# Patient Record
Sex: MALE
[De-identification: names, ages, dates, MRNs, and addresses within clinical notes are randomized per-mention and may not be internally consistent; named-entity substitution may affect disease eponyms.]

---

## 2019-10-21 ENCOUNTER — HOSPITAL ENCOUNTER (INPATIENT)
Dept: HOSPITAL 97 - ER | Age: 44
LOS: 4 days | Discharge: PSYCHIATRIC OR SUBSTANCE USE FACILITY | DRG: 896 | End: 2019-10-25
Attending: FAMILY MEDICINE | Admitting: HOSPITALIST
Payer: SELF-PAY

## 2019-10-21 VITALS — BODY MASS INDEX: 24 KG/M2

## 2019-10-21 DIAGNOSIS — F14.10: ICD-10-CM

## 2019-10-21 DIAGNOSIS — K21.9: ICD-10-CM

## 2019-10-21 DIAGNOSIS — F10.121: Primary | ICD-10-CM

## 2019-10-21 DIAGNOSIS — F15.10: ICD-10-CM

## 2019-10-21 DIAGNOSIS — Z72.89: ICD-10-CM

## 2019-10-21 DIAGNOSIS — R45.851: ICD-10-CM

## 2019-10-21 DIAGNOSIS — E87.2: ICD-10-CM

## 2019-10-21 DIAGNOSIS — F41.8: ICD-10-CM
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
## 2019-10-21 DIAGNOSIS — G93.41: ICD-10-CM

## 2019-10-21 DIAGNOSIS — E86.0: ICD-10-CM

## 2019-10-21 LAB
ALBUMIN SERPL BCP-MCNC: 4 G/DL (ref 3.4–5)
ALP SERPL-CCNC: 116 U/L (ref 45–117)
ALT SERPL W P-5'-P-CCNC: 34 U/L (ref 12–78)
AST SERPL W P-5'-P-CCNC: 29 U/L (ref 15–37)
BUN BLD-MCNC: 5 MG/DL (ref 7–18)
COHGB MFR BLDA: 2.5 % (ref 0–1.5)
GLUCOSE SERPLBLD-MCNC: 119 MG/DL (ref 74–106)
HCT VFR BLD CALC: 43.4 % (ref 39.6–49)
LYMPHOCYTES # SPEC AUTO: 1.6 K/UL (ref 0.7–4.9)
MAGNESIUM SERPL-MCNC: 2.5 MG/DL (ref 1.8–2.4)
NT-PROBNP SERPL-MCNC: 18 PG/ML (ref ?–125)
OXYHGB MFR BLDA: 95.5 % (ref 94–97)
PMV BLD: 8.1 FL (ref 7.6–11.3)
POTASSIUM SERPL-SCNC: 3.4 MMOL/L (ref 3.5–5.1)
RBC # BLD: 4.51 M/UL (ref 4.33–5.43)
SAO2 % BLDA: 98.7 % (ref 92–98.5)
TROPONIN (EMERG DEPT USE ONLY): < 0.02 NG/ML (ref 0–0.04)

## 2019-10-21 PROCEDURE — 82805 BLOOD GASES W/O2 SATURATION: CPT

## 2019-10-21 PROCEDURE — 99285 EMERGENCY DEPT VISIT HI MDM: CPT

## 2019-10-21 PROCEDURE — 72125 CT NECK SPINE W/O DYE: CPT

## 2019-10-21 PROCEDURE — 93005 ELECTROCARDIOGRAM TRACING: CPT

## 2019-10-21 PROCEDURE — 85025 COMPLETE CBC W/AUTO DIFF WBC: CPT

## 2019-10-21 PROCEDURE — 83735 ASSAY OF MAGNESIUM: CPT

## 2019-10-21 PROCEDURE — 84484 ASSAY OF TROPONIN QUANT: CPT

## 2019-10-21 PROCEDURE — 96367 TX/PROPH/DG ADDL SEQ IV INF: CPT

## 2019-10-21 PROCEDURE — 80329 ANALGESICS NON-OPIOID 1 OR 2: CPT

## 2019-10-21 PROCEDURE — 71045 X-RAY EXAM CHEST 1 VIEW: CPT

## 2019-10-21 PROCEDURE — 80048 BASIC METABOLIC PNL TOTAL CA: CPT

## 2019-10-21 PROCEDURE — 97116 GAIT TRAINING THERAPY: CPT

## 2019-10-21 PROCEDURE — 84145 PROCALCITONIN (PCT): CPT

## 2019-10-21 PROCEDURE — 85730 THROMBOPLASTIN TIME PARTIAL: CPT

## 2019-10-21 PROCEDURE — 70450 CT HEAD/BRAIN W/O DYE: CPT

## 2019-10-21 PROCEDURE — 97161 PT EVAL LOW COMPLEX 20 MIN: CPT

## 2019-10-21 PROCEDURE — 80164 ASSAY DIPROPYLACETIC ACD TOT: CPT

## 2019-10-21 PROCEDURE — 96365 THER/PROPH/DIAG IV INF INIT: CPT

## 2019-10-21 PROCEDURE — 36415 COLL VENOUS BLD VENIPUNCTURE: CPT

## 2019-10-21 PROCEDURE — 96361 HYDRATE IV INFUSION ADD-ON: CPT

## 2019-10-21 PROCEDURE — 80053 COMPREHEN METABOLIC PANEL: CPT

## 2019-10-21 PROCEDURE — 80076 HEPATIC FUNCTION PANEL: CPT

## 2019-10-21 PROCEDURE — 51702 INSERT TEMP BLADDER CATH: CPT

## 2019-10-21 PROCEDURE — 83605 ASSAY OF LACTIC ACID: CPT

## 2019-10-21 PROCEDURE — 81003 URINALYSIS AUTO W/O SCOPE: CPT

## 2019-10-21 PROCEDURE — 80307 DRUG TEST PRSMV CHEM ANLYZR: CPT

## 2019-10-21 PROCEDURE — 82947 ASSAY GLUCOSE BLOOD QUANT: CPT

## 2019-10-21 PROCEDURE — 96368 THER/DIAG CONCURRENT INF: CPT

## 2019-10-21 PROCEDURE — 97112 NEUROMUSCULAR REEDUCATION: CPT

## 2019-10-21 PROCEDURE — 85610 PROTHROMBIN TIME: CPT

## 2019-10-21 PROCEDURE — 80178 ASSAY OF LITHIUM: CPT

## 2019-10-21 PROCEDURE — 80320 DRUG SCREEN QUANTALCOHOLS: CPT

## 2019-10-21 PROCEDURE — 83880 ASSAY OF NATRIURETIC PEPTIDE: CPT

## 2019-10-21 NOTE — EDPHYS
Physician Documentation                                                                           

 [facility]                                                                 

Name: Milo Araujo Jr                                                                          

Age: 44 yrs                                                                                       

Sex: Male                                                                                         

: 1975                                                                                   

MRN: [id number]                                                                                   

Arrival Date: 10/21/2019                                                                          

Time: 22:48                                                                                       

Account#: [id number]                                                                            

Bed 4                                                                                             

Private MD:                                                                                       

ED Physician Ramesh Woo                                                                      

HPI:                                                                                              

10/21                                                                                             

22:57 This 44 yrs old  Male presents to ER via EMS with complaints of AMS, DRUG       mustapha 

      OVERDOSE.                                                                                   

22:57 The patient presents with a history of heart racing. Possible causes: drug use,         mustapha 

      alcohol, head injury, low blood sugar, sepsis. Associated signs and symptoms: Pertinent     

      positives: combativeness, confusion. Patient's baseline: Neuro: alert and fully             

      oriented. Unable to obtain HPI due to obtunded state, patient is being uncooperative.       

                                                                                                  

Historical:                                                                                       

- Allergies:                                                                                      

23:22 No Known Allergies;                                                                     wh  

- Home Meds:                                                                                      

23:22 Unable to obtain [Active];                                                              wh  

- PMHx:                                                                                           

23:22 Unable to obtain;                                                                         

- PSHx:                                                                                           

23:22 Unable to obtain;                                                                         

                                                                                                  

- Immunization history:: Adult Immunizations unknown.                                             

- Social history:: Smoking status: Patient uses tobacco products.                                 

- Family history:: not pertinent.                                                                 

- Ebola Screening: : No symptoms or risks identified at this time.                                

                                                                                                  

                                                                                                  

ROS:                                                                                              

22:57 Constitutional: Negative for fever, chills, and weight loss, Eyes: Negative for injury, mustapha 

      pain, redness, and discharge, ENT: Negative for injury, pain, and discharge, Neck:          

      Negative for injury, pain, and swelling, Respiratory: Negative for shortness of breath,     

      cough, wheezing, and pleuritic chest pain, Abdomen/GI: Negative for abdominal pain,         

      nausea, vomiting, diarrhea, and constipation, Back: Negative for injury and pain, :       

      Negative for injury, bleeding, discharge, and swelling, MS/Extremity: Negative for          

      injury and deformity, Skin: Negative for injury, rash, and discoloration, Psych:            

      Negative for depression, anxiety, suicide ideation, homicidal ideation, and                 

      hallucinations, Allergy/Immunology: Negative for hives, rash, and allergies, Endocrine:     

      Negative for neck swelling, polydipsia, polyuria, polyphagia, and marked weight             

      changes, Hematologic/Lymphatic: Negative for swollen nodes, abnormal bleeding, and          

      unusual bruising.                                                                           

22:57 Cardiovascular: Positive for palpitations.                                                  

22:57 Neuro: Positive for altered mental status.                                                  

                                                                                                  

Exam:                                                                                             

22:57 Constitutional:  This is a well developed, well nourished patient who is awake, alert,  mustapha 

      and in no acute distress. Head/Face:  Normocephalic, atraumatic. Eyes:  Pupils equal        

      round and reactive to light, extra-ocular motions intact.  Lids and lashes normal.          

      Conjunctiva and sclera are non-icteric and not injected.  Cornea within normal limits.      

      Periorbital areas with no swelling, redness, or edema. ENT:  Nares patent. No nasal         

      discharge, no septal abnormalities noted.  Tympanic membranes are normal and external       

      auditory canals are clear.  Oropharynx with no redness, swelling, or masses, exudates,      

      or evidence of obstruction, uvula midline.  Mucous membranes moist. Neck:  Trachea          

      midline, no thyromegaly or masses palpated, and no cervical lymphadenopathy.  Supple,       

      full range of motion without nuchal rigidity, or vertebral point tenderness.  No            

      Meningismus. Chest/axilla:  Normal chest wall appearance and motion.  Nontender with no     

      deformity.  No lesions are appreciated. Respiratory:  Lungs have equal breath sounds        

      bilaterally, clear to auscultation and percussion.  No rales, rhonchi or wheezes noted.     

       No increased work of breathing, no retractions or nasal flaring. Abdomen/GI:  Soft,        

      non-tender, with normal bowel sounds.  No distension or tympany.  No guarding or            

      rebound.  No evidence of tenderness throughout. Back:  No spinal tenderness.  No            

      costovertebral tenderness.  Full range of motion. Male :  Normal genitalia with no        

      discharge or lesions. Skin:  Warm, dry with normal turgor.  Normal color with no            

      rashes, no lesions, and no evidence of cellulitis. MS/ Extremity:  Pulses equal, no         

      cyanosis.  Neurovascular intact.  Full, normal range of motion. Psych:  Awake, alert,       

      with orientation to person, place and time.  Behavior, mood, and affect are within          

      normal limits.                                                                              

22:57 Cardiovascular: Rate: tachycardic, Rhythm: regular, Pulses: Pulses are 2+ in bilateral      

      radial, brachial, femoral, popliteal, posterior tibial and and dorsalis pedis               

      arteries.. Heart sounds: normal, normal S1and S2, no S3 or S4, no murmur, no rub, no        

      gallop, Edema: is not appreciated, JVD: is not appreciated.                                 

                                                                                                  

Vital Signs:                                                                                      

22:53 BP 84 / 68; Pulse 140; Resp 20; Temp 96.9; Pulse Ox 99% 2 lpm ;                           

10/22                                                                                             

00:04 Weight 81.65 kg;                                                                        ea  

00:06  / 72; Pulse 124; Resp 18; Pulse Ox 100% ;                                        ea  

00:21  / 72; Pulse 119; Resp 18; Pulse Ox 100% ;                                        ea  

00:25 Temp 97.4;                                                                              ea  

01:00  / 64; Pulse 105; Resp 18; Pulse Ox 98% on R/A;                                   ea  

02:00  / 65; Pulse 98; Resp 18; Temp 97.6; Pulse Ox 98% on R/A;                         ea  

03:00  / 68; Pulse 98; Resp 18; Pulse Ox 97% on R/A;                                      

                                                                                                  

MDM:                                                                                              

10/21                                                                                             

22:49 Patient medically screened.                                                             [facility] 

23:00 Data reviewed: vital signs, nurses notes, lab test result(s), EKG, radiologic studies,  [facility] 

      CT scan, plain films.                                                                       

                                                                                                  

10/21                                                                                             

22:50 Order name: Acetaminophen; Complete Time: 00:13                                           

10/21                                                                                             

22:50 Order name: Basic Metabolic Panel; Complete Time: 00:13                                   

10/21                                                                                             

22:50 Order name: CBC with Diff; Complete Time: 23:54                                           

10/21                                                                                             

22:50 Order name: ETOH Level; Complete Time: 23:54                                              

10/21                                                                                             

22:50 Order name: Hepatic Function; Complete Time: 00:13                                        

10/21                                                                                             

22:50 Order name: PT-INR                                                                        

10/21                                                                                             

22:50 Order name: Ptt, Activated                                                                

10/21                                                                                             

22:50 Order name: Salicylate; Complete Time: 23:54                                              

10/21                                                                                             

22:50 Order name: Urine Drug Screen                                                             

10/21                                                                                             

22:56 Order name: Procalcitonin                                                               [facility] 

10/21                                                                                             

22:56 Order name: Lactate; Complete Time: 23:54                                               [facility] 

10/21                                                                                             

22:56 Order name: XRAY Chest (1 view)                                                         [facility] 

10/21                                                                                             

22:56 Order name: CT Head C Spine                                                             [facility] 

10/21                                                                                             

23:04 Order name: ABG; Complete Time: 23:54                                                   aa1 

10/21                                                                                             

23:04 Order name: Urine Dipstick--Ancillary (enter results); Complete Time: 23:54             cm6 

10/21                                                                                             

23:40 Order name: Troponin (Emerg Dept Use Only); Complete Time: 00:13                        [facility]

10/21                                                                                             

23:40 Order name: NT PRO-BNP; Complete Time: 00:13                                            [facility]

10/21                                                                                             

23:40 Order name: Magnesium; Complete Time: 00:13                                             [facility]

10/22                                                                                             

00:14 Order name: Lactate                                                                     cm6 

10/22                                                                                             

01:15 Order name: CBC with Automated Diff                                                     [facility]

10/22                                                                                             

01:15 Order name: CBC with Automated Diff                                                     [facility]

10/22                                                                                             

01:15 Order name: Comprehensive Metabolic Panel                                               [facility]

10/22                                                                                             

01:15 Order name: Comprehensive Metabolic Panel                                               [facility]

10/21                                                                                             

22:50 Order name: EKG; Complete Time: 22:51                                                     

10/21                                                                                             

22:50 Order name: EKG - Nurse/Tech; Complete Time: 23:00                                        

10/21                                                                                             

22:50 Order name: IV Saline Lock; Complete Time: 23:00                                          

10/21                                                                                             

22:50 Order name: Labs collected and sent; Complete Time: 23:01                                 

10/21                                                                                             

22:50 Order name: Urine Dipstick-Ancillary (obtain specimen); Complete Time: 23:01              

10/21                                                                                             

22:56 Order name: Cardiac monitoring; Complete Time: 23:01                                    [facility] 

10/21                                                                                             

22:56 Order name: O2 Per Protocol; Complete Time: 23:01                                       [facility] 

10/21                                                                                             

22:56 Order name: O2 Sat Monitoring; Complete Time: 23:01                                     [facility] 

10/21                                                                                             

22:56 Order name: Garcia; Complete Time: 23:01                                                 [facility] 

10/21                                                                                             

22:56 Order name: Restraint:Violent/Self Destructive (Adult:17yo or >); Complete Time: 23:01  [facility] 

10/22                                                                                             

01:15 Order name: CONS Pharmacy Consult                                                       [facility]

10/22                                                                                             

01:15 Order name: Regular                                                                     [facility]

                                                                                                  

Administered Medications:                                                                         

23:00 Drug: NS 0.9% (30 ml/kg) 30 ml/kg Route: IV; Rate: bolus; Site: right forearm;            

10/22                                                                                             

01:56 Follow up: Response: No adverse reaction; IV Status: Completed infusion; IV Intake:     ea  

      2500ml                                                                                      

10/21                                                                                             

23:10 Drug: foLIC Acid 1 mg Route: IVPB; Site: right forearm;                                 ea  

10/22                                                                                             

00:04 Follow up: IV Status: Completed infusion                                                  

10/21                                                                                             

23:11 Drug: Thiamine 100 mg Route: IV; Rate: bolus; Site: right forearm;                      ea  

10/22                                                                                             

00:04 Follow up: Response: No adverse reaction; IV Status: Completed infusion                   

10/21                                                                                             

23:15 Drug: Zosyn 3.375 grams Route: IVPB; Infused Over: 60 mins; Site: left antecubital;       

10/22                                                                                             

00:20 Follow up: Response: No adverse reaction; IV Status: Completed infusion; IV Intake:     ea  

      100ml                                                                                       

00:04 Drug: NS 0.9% 1000 ml Route: IV; Rate: 125 ml/hr; Site: left antecubital;                 

01:54 Drug: Potassium Chloride 20 mEq Route: IV; Rate: per protocol; Site: left antecubital;    

03:22 Follow up: Response: No adverse reaction; IV Status: Completed infusion                   

                                                                                                  

                                                                                                  

Disposition:                                                                                      

10/21/19 23:03 Hospitalization ordered by Merrick Deras for Inpatient Admission. Preliminary     

  diagnosis are Altered mental status, unspecified, Tachycardia, unspecified,                     

  Abuse of non-psychoactive substances, Hypokalemia.                                              

- Bed requested for Telemetry/MedSurg (Inpatient).                                                

- Status is Inpatient Admission.                                                                

- Condition is Stable.                                                                            

- Problem is new.                                                                                 

- Symptoms are unchanged.                                                                         

UTI on Admission? No                                                                              

                                                                                                  

                                                                                                  

                                                                                                  

Signatures:                                                                                       

Dispatcher [employer]                           EDMS                                                 

Ramesh Woo MD MD cha Garcia, Cindy, RN                       PROSPER                                                      

Latasha Truong RN RN ea Habalo, Winsy                                                                                   

                                                                                                  

Corrections: (The following items were deleted from the chart)                                    

10/21                                                                                             

23:39 22:57 MAGNESIUM+C.LAB.BRZ ordered. [facility]

23:39 22:57 PROBNP+C.LAB.BRZ ordered. [facility]                                                    EDMS

23:39 22:57 TROPONIN (EMERG DEPT USE ONLY)+C.LAB.BRZ ordered. [facility]

10/22                                                                                             

00:14 10/21 23:03 Hospitalization Ordered by Merrick Deras MD for Inpatient Admission.        [facility] 

      Preliminary diagnosis is Altered mental status, unspecified; Tachycardia, unspecified;      

      Abuse of non-psychoactive substances. Bed requested for Telemetry/MedSurg (Inpatient).      

      Status is Inpatient Admission. Condition is Stable. Problem is new. Symptoms are            

      unchanged. UTI on Admission? No. mustapha                                                        

10/22                                                                                             

01:53 00:14 10/21/2019 23:03 Hospitalization Ordered by Merrick Deras MD for Inpatient        [facility]  

      Admission. Preliminary diagnosis is Altered mental status, unspecified; Tachycardia,        

      unspecified; Abuse of non-psychoactive substances; Hypokalemia. Bed requested for           

      Telemetry/MedSurg (Inpatient). Status is Inpatient Admission. Condition is Stable.          

      Problem is new. Symptoms are unchanged. UTI on Admission? No. mustapha                           

03:19 01:53 10/21/2019 23:03 Hospitalization Ordered by Merrick Deras MD for Inpatient          

      Admission. Preliminary diagnosis is Altered mental status, unspecified; Tachycardia,        

      unspecified; Abuse of non-psychoactive substances; Hypokalemia. Bed requested for           

      Telemetry/MedSurg (Inpatient). Status is Inpatient Admission. Condition is Stable.          

      Problem is new. Symptoms are unchanged. UTI on Admission? No.                             

                                                                                                  

**************************************************************************************************

## 2019-10-21 NOTE — ER
Nurse's Notes                                                                                     

 [facility]                                                                 

Name: Milo Araujo Jr                                                                          

Age: 44 yrs                                                                                       

Sex: Male                                                                                         

: 1975                                                                                   

MRN: [id number]                                                                                   

Arrival Date: 10/21/2019                                                                          

Time: 22:48                                                                                       

Account#: [id number]                                                                            

Bed 4                                                                                             

Private MD:                                                                                       

Diagnosis: Altered mental status, unspecified;Tachycardia, unspecified;Abuse of non-psychoactive  

  substances;Hypokalemia                                                                          

                                                                                                  

Presentation:                                                                                     

10/21                                                                                             

22:49 Presenting complaint: EMS states: [place], someone was      

      able to track his friend and his friend called EMS. Transition of care: patient was not     

      received from another setting of care. Onset of symptoms was 2019. Risk         

      Assessment: Do you want to hurt yourself or someone else? Unable to obtain. Initial         

      Sepsis Screen: Does the patient meet any 2 criteria? HR > 90 bpm. Does the patient have     

      a suspected source of infection? No. Patient's initial sepsis screen is negative. Care      

      prior to arrival: Medication(s) given: Narcan 2 mg IV IV initiated. 18 GA, in the left      

      antecubital area, Glucose check: 108.                                                       

22:49 Method Of Arrival: EMS: [facility]                                                         

22:49 Acuity: JEFFREY 3                                                                             

                                                                                                  

Triage Assessment:                                                                                

23:25 General: Appears Unresponsive.                                                            

                                                                                                  

Historical:                                                                                       

- Allergies:                                                                                      

23:22 No Known Allergies;                                                                       

- Home Meds:                                                                                      

23:22 Unable to obtain [Active];                                                                

- PMHx:                                                                                           

23:22 Unable to obtain;                                                                         

- PSHx:                                                                                           

23:22 Unable to obtain;                                                                         

                                                                                                  

- Immunization history:: Adult Immunizations unknown.                                             

- Social history:: Smoking status: Patient uses tobacco products.                                 

- Family history:: not pertinent.                                                                 

- Ebola Screening: : No symptoms or risks identified at this time.                                

                                                                                                  

                                                                                                  

Screenin:22 Abuse screen: Denies threats or abuse. Denies injuries from another. Nutritional          

      screening: No deficits noted. Tuberculosis screening: No symptoms or risk factors           

      identified. Fall Risk None identified.                                                      

                                                                                                  

Assessment:                                                                                       

23:23 General: Behavior is unresponsive. Pt responds to pain. Pain: Unable to use pain scale. wh  

      Patient is unresponsive. Neuro: Level of Consciousness is unresponsive, Pupils are          

      pinpoint. Cardiovascular: Heart tones S1 S2. Respiratory: Airway is patent Respiratory      

      effort is even, unlabored, Respiratory pattern is regular, symmetrical, Breath sounds       

      are clear bilaterally. GI: Abdomen is flat, non-distended. : Genitalia appear normal.     

      EENT: Oral mucosa is moist. Derm: Skin is intact, is healthy with good turgor, Skin is      

      pink, warm \T\ dry. normal. Musculoskeletal: Circulation, motion, and sensation intact.     

10/22                                                                                             

00:00 Reassessment: Patient and/or family updated on plan of care and expected duration. Pain ea  

      level reassessed. Pt resting no longer attempting to remove IV lines. Order obtained to     

      discontinue restraints. Restraints discontinued, pt tolerated well. Pt resting with         

      eyes closed, respirations even and symmetrical. No s/s of pain or discomfort.               

01:00 Reassessment: Patient and/or family updated on plan of care and expected duration. Pain ea  

      level reassessed. Pt resting with eyes closed, respirations even and unlabored, chest       

      expansions even and symmetrical.                                                            

02:45 Reassessment: Patient and/or family updated on plan of care and expected duration. Pain ea  

      level reassessed. Pt resting with eyes closed, respirations even and unlabored. Chest       

      expansions even and symmetrical. No s/s of pain or discomfort noted at this time.           

03:20 Reassessment: Patient and/or family updated on plan of care and expected duration. Pain ea  

      level reassessed. Pt resting with eyes closed, respirations even and unlabored, chest       

      expansions even and symmetrical. No s/s of pain or discomfort noted at this time. Pt        

      admitted to fourth floor, left ED via stretcher, tolerating well.                           

                                                                                                  

Vital Signs:                                                                                      

10/21                                                                                             

22:53 BP 84 / 68; Pulse 140; Resp 20; Temp 96.9; Pulse Ox 99% 2 lpm ;                           

10/22                                                                                             

00:04 Weight 81.65 kg;                                                                        ea  

00:06  / 72; Pulse 124; Resp 18; Pulse Ox 100% ;                                        ea  

00:21  / 72; Pulse 119; Resp 18; Pulse Ox 100% ;                                        ea  

00:25 Temp 97.4;                                                                              ea  

01:00  / 64; Pulse 105; Resp 18; Pulse Ox 98% on R/A;                                   ea  

02:00  / 65; Pulse 98; Resp 18; Temp 97.6; Pulse Ox 98% on R/A;                         ea  

03:00  / 68; Pulse 98; Resp 18; Pulse Ox 97% on R/A;                                      

                                                                                                  

ED Course:                                                                                        

10/21                                                                                             

22:48 Patient arrived in ED.                                                                    

22:49 Ramesh Woo MD is Attending Physician.                                             [facility] 

22:49 Faustino Santana is Primary Nurse.                                                           

22:49 EKG done, by ED staff, reviewed by Ramesh Woo MD. Straight cath inserted, using     mt  

      sterile technique, 16 Fr. Specimen obtained. Returned clear yellow urine. Patient           

      tolerated well.                                                                             

22:50 Inserted saline lock: 18 gauge in right forearm, using aseptic technique.               ea  

22:52 Triage completed.                                                                         

23:02 Merrick Deras MD is Hospitalizing Provider.                                           [facility] 

23:02 Garcia cath inserted, using sterile technique, 16 Fr., by me, balloon inflated, to       mt  

      gravity drainage, urine specimen collected. returned clear yellow urine. Patient            

      tolerated well.                                                                             

23:05 Patient has correct armband on for positive identification. Placed in gown. Bed in low  wh  

      position. Call light in reach. Side rails up X 1. Cardiac monitor on. Pulse ox on. NIBP     

      on.                                                                                         

23:11 Radiology exam delayed due to Patient is currently being attended by                    kw1 

      nursing/respiratory staff.                                                                  

23:22 Arm band placed on right wrist.                                                           

23:25 XRAY Chest (1 view) In Process Unspecified.                                             [facility]

23:49 CT Head C Spine In Process Unspecified.                                                 [facility]

10/22                                                                                             

03:14 No provider procedures requiring assistance completed. Patient admitted, IV remains in  wh  

      place.                                                                                      

                                                                                                  

Restraints:                                                                                       

10/21                                                                                             

22:56 Violent/Self Destructive Restraint: Order: obtained. Initiated 2019 at      ea  

      22:56 Staff present during the Initiation of Restraint: Faustino Zafar RN .            

      Observed actions/behavior: unable to follow instructions, rptd attempts to                  

      remove/tamper lines/tubes/IV/med devices \T\ wnd dressing, Monitoring: Mental status:       

      confused. Cognition: Unable to assess. Circulation: Within defined parameters (based on     

      Cardiovascular assessment). Skin integrity: Within defined parameters (based on             

      Integumentary assessment) Restraint status: Soft wrist restraint (Right) Started. Soft      

      wrist restraint (Left) Started.                                                             

10/22                                                                                             

00:00 Violent/Self Destructive Restraint: Readiness for Discontinue: Release criteria met. No ea  

      longer exhibiting violent or self destructive behavior. Alt interventions effective.        

      Restraint discontinuation: Discontinued at 2019 at 00:00.                       

                                                                                                  

Administered Medications:                                                                         

10/21                                                                                             

23:00 Drug: NS 0.9% (30 ml/kg) 30 ml/kg Route: IV; Rate: bolus; Site: right forearm;          ea  

10/22                                                                                             

01:56 Follow up: Response: No adverse reaction; IV Status: Completed infusion; IV Intake:     ea  

      2500ml                                                                                      

10/21                                                                                             

23:10 Drug: foLIC Acid 1 mg Route: IVPB; Site: right forearm;                                 ea  

10/22                                                                                             

00:04 Follow up: IV Status: Completed infusion                                                  

10/21                                                                                             

23:11 Drug: Thiamine 100 mg Route: IV; Rate: bolus; Site: right forearm;                      ea  

10/22                                                                                             

00:04 Follow up: Response: No adverse reaction; IV Status: Completed infusion                   

10/21                                                                                             

23:15 Drug: Zosyn 3.375 grams Route: IVPB; Infused Over: 60 mins; Site: left antecubital;     ea  

10/22                                                                                             

00:20 Follow up: Response: No adverse reaction; IV Status: Completed infusion; IV Intake:     ea  

      100ml                                                                                       

00:04 Drug: NS 0.9% 1000 ml Route: IV; Rate: 125 ml/hr; Site: left antecubital;                 

01:54 Drug: Potassium Chloride 20 mEq Route: IV; Rate: per protocol; Site: left antecubital;  ea  

03:22 Follow up: Response: No adverse reaction; IV Status: Completed infusion                   

                                                                                                  

                                                                                                  

Intake:                                                                                           

00:20 IV: 100ml; Total: 100ml.                                                                ea  

01:56 IV: 2500ml; Total: 2600ml.                                                                

                                                                                                  

Outcome:                                                                                          

10/21                                                                                             

23:03 Decision to Hospitalize by Provider.                                                    [facility] 

10/22                                                                                             

03:15 Admitted to Tele accompanied by tech, via stretcher, room 401, with chart, Report       wh  

      called to  Moody Beauchamp RN                                                                 

      Condition: stable                                                                           

      Instructed on the need for admit.                                                           

03:19 Patient left the ED.                                                                      

                                                                                                  

Signatures:                                                                                       

Dispatcher [employer]                           EDRamesh Berg MD MD cha Thompson, Moriah mt Antunez, Elena RN                      Faustino Duffy ea, Kimberly                            kw1                                                  

                                                                                                  

Corrections: (The following items were deleted from the chart)                                    

02:26 02:00  / 65; Pulse 98bpm; Resp 18bpm; Pulse Ox 98% RA; ea                           

03:18 03:15 Admitted to Tele accompanied by tech, via stretcher, room 407, with chart, Report jonathan  

      called to Moody castillo                                                               

                                                                                                  

**************************************************************************************************

## 2019-10-22 LAB
ALBUMIN SERPL BCP-MCNC: 3.2 G/DL (ref 3.4–5)
ALP SERPL-CCNC: 93 U/L (ref 45–117)
ALT SERPL W P-5'-P-CCNC: 26 U/L (ref 12–78)
AST SERPL W P-5'-P-CCNC: 22 U/L (ref 15–37)
BLD SMEAR INTERP: (no result)
BUN BLD-MCNC: 4 MG/DL (ref 7–18)
GLUCOSE SERPLBLD-MCNC: 75 MG/DL (ref 74–106)
HCT VFR BLD CALC: 43 % (ref 39.6–49)
INR BLD: 1
LYMPHOCYTES # SPEC AUTO: 0.7 K/UL (ref 0.7–4.9)
METHAMPHET UR QL SCN: POSITIVE
MORPHOLOGY BLD-IMP: (no result)
PMV BLD: 8.3 FL (ref 7.6–11.3)
POTASSIUM SERPL-SCNC: 3.6 MMOL/L (ref 3.5–5.1)
RBC # BLD: 4.51 M/UL (ref 4.33–5.43)
THC SERPL-MCNC: NEGATIVE NG/ML

## 2019-10-22 RX ADMIN — SODIUM CHLORIDE SCH MLS: 9 INJECTION, SOLUTION INTRAVENOUS at 10:52

## 2019-10-22 RX ADMIN — Medication SCH: at 09:00

## 2019-10-22 RX ADMIN — Medication SCH ML: at 20:52

## 2019-10-22 NOTE — RAD REPORT
EXAM DESCRIPTION:  RAD - Chest Single View - 10/21/2019 11:27 pm

 

CLINICAL HISTORY:  Cough, altered mental status

 

COMPARISON:  None.

 

TECHNIQUE:  AP portable chest image was obtained 2321 hours .

 

FINDINGS:  Lungs are clear. Heart and vasculature are normal. No measurable pleural effusion and no p
neumothorax. No acute bony abnormality seen. No acute aortic findings suspected.

 

IMPRESSION:  No acute cardiopulmonary process.

## 2019-10-22 NOTE — P.HP
Certification for Inpatient


Patient admitted to: Inpatient


With expected LOS: >2 Midnights


Patient will require the following post-hospital care: None


Practitioner: I am a practitioner with admitting privileges, knowledge of 

patient current condition, hospital course, and medical plan of care.


Services: Services provided to patient in accordance with Admission 

requirements found in Title 42 Section 412.3 of the Code of Federal Regulations





Patient History


Date of Service: 10/22/19


Reason for admission: Altered mental status


History of Present Illness: 





Patient is a 44-year-old gentleman who came into the hospital lethargic and 

difficult to arouse.  Patient was found to have cocaine and amphetamine 

intoxication as well as alcohol toxicity.  Patient was brought into the 

emergency room and a CT of the brain was ordered.  This was negative.  Patient 

had multiple lab studies done which were unremarkable except for the alcohol 

level in the positive drug screen.  Patient also had lactic acidosis.  At this 

time, patient be admitted to the hospital for further evaluation.


Allergies





Unable to Assess Allergy (Unverified 10/22/19 02:40)


 








- Past Medical/Surgical History


-: UNABLE TO OBTAIN HISTORY, PT IS AMS


Past Surgical History: Unable to obtain





- Family History


  ** Father


Family History: Reviewed- Non-Contributory





- Social History


Smoking Status: Unknown if ever smoked





Review of Systems


10-point ROS is otherwise unremarkable





Physical Examination





- Vital Signs


Temperature: 97.1 F


Blood Pressure: 120/65


Pulse: 129


Respirations: 18


Pulse Ox (%): 98





- Physical Exam


General: Alert, In no apparent distress, Oriented x3


HEENT: Atraumatic, PERRLA, Mucous membr. moist/pink, EOMI, Sclerae nonicteric


Neck: Supple, 2+ carotid pulse no bruit, No LAD, Without JVD or thyroid 

abnormality


Respiratory: Clear to auscultation bilaterally, Normal air movement


Cardiovascular: Regular rate/rhythm, Normal S1 S2, No murmurs


Gastrointestinal: Normal bowel sounds, Soft and benign, Non-distended, No 

tenderness


Musculoskeletal: No clubbing, No swelling, No tenderness


Integumentary: No rashes


Neurological: Normal gait, Normal speech, Normal strength at 5/5 x4 extr, 

Normal tone, Sensation intact, Cranial nerves 3-12 intact, Normal affect


Lymphatics: No axilla or inguinal lymphadenopathy





- Studies


Laboratory Data (last 24 hrs)





10/22/19 00:47: PT 11.8, INR 1.00, APTT 22.1 L


10/21/19 23:05: Magnesium Cancelled


10/21/19 23:05: WBC 3.1 L, Hgb 14.6, Hct 43.4, Plt Count 185


10/21/19 22:50: Sodium 139, Potassium 3.4 L, BUN 5 L, Creatinine 1.07, Glucose 

119 H, Magnesium 2.5 H, Total Bilirubin 0.6, AST 29, ALT 34, Alkaline 

Phosphatase 116








Assessment & Plan





- Problems (Diagnosis)


(1) Altered mental status


Current Visit: Yes   Status: Acute   





(2) Amphetamine abuse


Current Visit: Yes   Status: Acute   





(3) Cocaine abuse


Current Visit: Yes   Status: Acute   





(4) Alcoholic intoxication


Current Visit: Yes   Status: Acute   





(5) Lactic acid acidosis


Current Visit: Yes   Status: Acute   





- Plan





Plan:


1. IV hydration; if patient wakes up and neuro status is back to baseline then 

anticipate discharge home


2. Monitor neuro status; neurochecks q2 hr


3. CT of the brain is negative; if patient does not wake up more alert then we 

may need neurology consultation


4. GI and DVT prophylaxis


Discharge Plan: Home


Plan to discharge in: 48 Hours





- Advance Directives


Does patient have a Living Will: No


Does patient have a Durable POA for Healthcare: No





- Code Status/Comfort Care


Code Status Assessed: Yes


Code Status: Full Code


Critical Care: No


Time Spent Managing PTS Care (In Minutes): 45

## 2019-10-22 NOTE — RAD REPORT
EXAM DESCRIPTION:  CT - [id number] - 10/22/2019 2:52 am

 

CLINICAL HISTORY:  Trauma.

 

COMPARISON:  None.

 

TECHNIQUE:  CT scan of the brain and cervical spine without IV contrast. This exam was performed acco
rding to our departmental dose-optimization program, which includes automated exposure control, adjus
tment of the mA and/or kV according to patient size and/or use of iterative reconstruction technique.


 

FINDINGS:  BRAIN:

The ventricles, cisterns, and sulci are age-appropriate. No evidence of acute infarction, intracrania
l hemorrhage, extra-axial fluid collection, or midline shift. No air-fluid levels are seen in the par
anasal sinuses to suggest acute sinusitis. No depressed skull fracture.

CERVICAL SPINE:

No acute cervical fracture or prevertebral soft tissue swelling. There is straightening of the normal
 cervical lordosis, which may be due to cervical collar, muscle spasm, or patient positioning. There 
is mild multilevel degenerative disc disease. The facet joints are intact. No high-grade spinal canal
 stenosis is identified. The visualized lungs are clear.

 

IMPRESSION:  1.   No acute intracranial hemorrhage.

2.   No acute fracture or subluxation of the cervical spine.

 

Electronically signed by:   Felipe Ross MD   10/21/2019 11:57 PM CDT Workstation: [id number]

 

 

Due to temporary technical issues with the PACS/Fluency reporting system, reports are being signed by
 the in house radiologist as a courtesy to ensure prompt reporting. The interpreting radiologist is f
ully responsible for the content of the report.

## 2019-10-22 NOTE — EKG
Test Date:    2019-10-21               Test Time:    22:42:18

Technician:   MT                                     

                                                     

MEASUREMENT RESULTS:                                       

Intervals:                                           

Rate:         151                                    

LA:                                                  

QRSD:         76                                     

QT:           280                                    

QTc:          443                                    

Axis:                                                

P:                                                   

LA:                                                  

QRS:          6                                      

T:            45                                     

                                                     

INTERPRETIVE STATEMENTS:                                       

                                                     

Supraventricular tachycardia

Nonspecific ST abnormality

Abnormal ECG

No previous ECG available for comparison



Electronically Signed On 10-22-19 12:09:08 CDT by Ian Dominguez

## 2019-10-23 LAB
ALBUMIN SERPL BCP-MCNC: 3.1 G/DL (ref 3.4–5)
ALP SERPL-CCNC: 100 U/L (ref 45–117)
ALT SERPL W P-5'-P-CCNC: 23 U/L (ref 12–78)
AST SERPL W P-5'-P-CCNC: 23 U/L (ref 15–37)
BUN BLD-MCNC: 6 MG/DL (ref 7–18)
GLUCOSE SERPLBLD-MCNC: 79 MG/DL (ref 74–106)
HCT VFR BLD CALC: 42.1 % (ref 39.6–49)
LYMPHOCYTES # SPEC AUTO: 0.9 K/UL (ref 0.7–4.9)
MORPHOLOGY BLD-IMP: (no result)
PMV BLD: 8.3 FL (ref 7.6–11.3)
POTASSIUM SERPL-SCNC: 3.3 MMOL/L (ref 3.5–5.1)
RBC # BLD: 4.46 M/UL (ref 4.33–5.43)

## 2019-10-23 RX ADMIN — SODIUM CHLORIDE SCH MLS: 9 INJECTION, SOLUTION INTRAVENOUS at 10:20

## 2019-10-23 RX ADMIN — SODIUM CHLORIDE SCH MLS: 0.9 INJECTION, SOLUTION INTRAVENOUS at 17:50

## 2019-10-23 RX ADMIN — Medication SCH ML: at 10:20

## 2019-10-23 RX ADMIN — Medication SCH ML: at 20:34

## 2019-10-23 NOTE — P.PN
Subjective


Date of Service: 10/23/19


Chief Complaint: Altered mental status





Pt seen and examined at bedside. Chart reviewed. Feeling better than before. Pt 

appears to be less agitated at this time. Will monitor closely in ICU     





Review of Systems


10-point ROS is otherwise unremarkable





Physical Examination





- Vital Signs


Temperature: 98.8 F


Blood Pressure: 108/83


Pulse: 101


Respirations: 20


Pulse Ox (%): 98





- Physical Exam


General: Alert, In no apparent distress


HEENT: Atraumatic, PERRLA, EOMI


Neck: Supple, JVD not distended


Respiratory: Clear to auscultation bilaterally, Normal air movement


Cardiovascular: Regular rate/rhythm, Normal S1 S2


Gastrointestinal: Normal bowel sounds, No tenderness


Musculoskeletal: No tenderness


Integumentary: No rashes


Neurological: Normal speech, Normal tone, Normal affect


Lymphatics: No axilla or inguinal lymphadenopathy





- Studies


Medications List Reviewed: Yes





Assessment And Plan





- Current Problems (Diagnosis)


(1) Altered mental status


Current Visit: Yes   Status: Chronic   


Plan: 


Metabolic Encephalopathy 


-Most likely 2.2 to Alcohol vs Drug abuse 


-Will monitor closely 





Qualifiers: 


   Altered mental status type: delirium   Qualified Code(s): R41.0 - 

Disorientation, unspecified   





(2) Alcoholic intoxication


Current Visit: Yes   Status: Acute   


Plan: 


Pt initial Alcohol level > 110


-IV fluids with multivitamins 


-CIWA protocol


\


Qualifiers: 


   Complication of substance-induced condition: with delirium   Qualified Code(s

): F10.921 - Alcohol use, unspecified with intoxication delirium   





(3) Amphetamine abuse


Current Visit: Yes   Status: Chronic   





(4) Cocaine abuse


Current Visit: Yes   Status: Chronic   





- Plan





Monitor in the ICU for now 





Discharge Plan: Home


Plan to discharge in: Greater than 2 days





- Code Status/Comfort Care


Code Status Assessed: Yes


Critical Care: No

## 2019-10-24 RX ADMIN — FAMOTIDINE SCH MG: 20 TABLET, FILM COATED ORAL at 21:10

## 2019-10-24 RX ADMIN — Medication SCH ML: at 09:00

## 2019-10-24 RX ADMIN — Medication SCH ML: at 21:00

## 2019-10-24 RX ADMIN — SODIUM CHLORIDE SCH MLS: 0.9 INJECTION, SOLUTION INTRAVENOUS at 01:12

## 2019-10-24 RX ADMIN — SODIUM CHLORIDE SCH MLS: 9 INJECTION, SOLUTION INTRAVENOUS at 08:53

## 2019-10-24 NOTE — P.PN
Subjective


Date of Service: 10/24/19


Primary Care Provider: none


Chief Complaint: Altered mental status


Subjective: Other (Patient appears improved.  Less agitated this morning.  

Nurses provided medication last night for agitation.)





Physical Examination





- Vital Signs


Temperature: 98.2 F


Blood Pressure: 121/87


Pulse: 68


Respirations: 12


Pulse Ox (%): 99





- Physical Exam


General: Alert, In no apparent distress, Cooperative


HEENT: Atraumatic


Neck: Supple


Respiratory: Clear to auscultation bilaterally, Normal air movement


Cardiovascular: Normal pulses, Regular rate/rhythm


Gastrointestinal: Normal bowel sounds, Soft and benign, Non-distended, No masses

, No rebound, No guarding


Neurological: Normal speech, Normal strength at 5/5 x4 extr, Normal tone, 

Normal affect





- Studies


Medications List Reviewed: Yes





Assessment & Plan


Discharge Plan: Psychiatry


Plan to discharge in: 24 Hours


Physician Review Additional Text: 





Impression:


Metabolic encephalopathy related to alcohol abuse with intoxication complicated 

with amphetamine and cocaine abuse


Suicidal ideation


Depression with anxiety





Plan:


Patient less agitated today.  Will transition off IV fluids.  Will provide 

Librium.  Will provide IV benzodiazepine as needed for agitation.  Will monitor 

the patient closely.  Will consult [facility] to evaluate patient for inpatient 

psychiatric evaluation and treatment.  Patient likely requires transfer to 

inpatient facility to continue his care.





Time Spent Managing Pts Care (In Minutes): 55

## 2019-10-25 VITALS — DIASTOLIC BLOOD PRESSURE: 91 MMHG | SYSTOLIC BLOOD PRESSURE: 102 MMHG

## 2019-10-25 VITALS — OXYGEN SATURATION: 95 %

## 2019-10-25 VITALS — TEMPERATURE: 97.9 F

## 2019-10-25 RX ADMIN — FAMOTIDINE SCH MG: 20 TABLET, FILM COATED ORAL at 08:20

## 2019-10-25 RX ADMIN — Medication SCH ML: at 08:21

## 2019-10-25 NOTE — P.DS
Admission Date: 10/22/19


Discharge Date: 10/25/19


Primary Care Provider: none


Disposition: TRANSFR TO OTHER-PSY/CD/REHAB


Discharge Condition: GOOD


Reason for Admission: Altered mental status


Consultations: 





none





Procedures: 





CT Scan: 





CXR: 





Medical Problem List: 


Metabolic encephalopathy related to alcohol abuse with intoxication complicated 

with amphetamine and cocaine abuse


Suicidal ideation


Depression with anxiety


Possible GERD





Brief History of Present Illness: 





44-year-old  male presented to the emergency room with altered mental 

status.  [place] the [place].  Patient was brought in for 

further evaluation.  Patient found to be severely dehydrated with lactic 

acidosis.  Positive for cocaine, amphetamine and alcohol.  Patient admitted for 

further evaluation and treatment.


Hospital Course: 





Patient presented with altered mental status secondary to metabolic 

encephalopathy with lactic acidosis due to dehydration.  Patient also positive 

for methamphetamines, alcohol and cocaine.  Patient with history of depression/

anxiety verses bipolar disorder.  Patient was given IV fluids with improvement.

  Patient was monitored and evaluated for alcohol withdrawal.  The patient did 

well in his stay.  Family was concerned that he may have overdosed on his 

psychiatric medications.  Patient takes multiple medications but unsure as the 

patient was recently incarcerated.  Patient was eventually evaluated by [facility].  

[facility] recommended inpatient psychiatric evaluation as patient was having some 

suicidal ideation.  Patient agreed with plan to transfer.  Inpatient 

psychiatric transfer was approved.  Case discussed with psychiatry who agrees 

with transfer.  Currently patient medically stable at this time.  Lactic 

acidosis has resolved.  Patient well hydrated.  Patient alert oriented and 

appropriate with his mobility.  Patient currently on Librium 10 mg t.i.d..  

This is held for increased sedation.  Patient also taking folic acid, thiamine 

and multi vitamin.  [facility] 

for further evaluation and treatment.


Vital Signs/Physical Exam: 














Temp Pulse Resp BP Pulse Ox


 


 98.2 F   91 H  12   102/91 H  96 


 


 10/25/19 12:20  10/25/19 15:00  10/25/19 15:00  10/25/19 15:00  10/25/19 15:00








General: Alert, In no apparent distress, Oriented x3, Cooperative


HEENT: Atraumatic


Neck: Supple


Respiratory: Clear to auscultation bilaterally, Normal air movement


Cardiovascular: Normal pulses, Regular rate/rhythm


Gastrointestinal: Normal bowel sounds, Soft and benign, Non-distended, No 

tenderness, No masses, No rebound, No guarding


Musculoskeletal: No erythema, No tenderness, No warmth


Integumentary: No tenderness/swelling, No erythema, No warmth, No cyanosis


Neurological: Normal speech, Normal strength at 5/5 x4 extr, Normal tone, 

Normal affect


Laboratory Data at Discharge: 














WBC  10.2 K/uL (4.3-10.9)  D 10/23/19  04:43    


 


Hgb  14.7 g/dL (13.6-17.9)   10/23/19  04:43    


 


Hct  42.1 % (39.6-49.0)   10/23/19  04:43    


 


Plt Count  181 K/uL (152-406)   10/23/19  04:43    


 


PT  11.8 SECONDS (9.5-12.5)   10/22/19  00:47    


 


INR  1.00   10/22/19  00:47    


 


APTT  22.1 SECONDS (24.3-36.9)  L  10/22/19  00:47    


 


Sodium  142 mmol/L (136-145)   10/23/19  04:43    


 


Potassium  3.3 mmol/L (3.5-5.1)  L  10/23/19  04:43    


 


BUN  6 mg/dL (7-18)  L  10/23/19  04:43    


 


Creatinine  0.79 mg/dL (0.55-1.3)   10/23/19  04:43    


 


Glucose  79 mg/dL ()   10/23/19  04:43    


 


Magnesium  Cancelled   10/21/19  23:05    


 


Total Bilirubin  0.8 mg/dL (0.2-1.0)   10/23/19  04:43    


 


AST  23 U/L (15-37)   10/23/19  04:43    


 


ALT  23 U/L (12-78)   10/23/19  04:43    


 


Alkaline Phosphatase  100 U/L ()   10/23/19  04:43    








Home Medications: 








Cyclobenzaprine HCl 10 mg PO TIDP PRN 10/24/19 


Divalproex Sodium 500 mg PO BID 10/24/19 


Escitalopram [Lexapro*] 20 mg PO DAILY 10/24/19 


Lithium Carbonate [Lithotabs *] 2 tab PO BEDTIME 10/24/19 


Quetiapine [Seroquel*] 100 mg PO BEDTIME 10/24/19 


risperiDONE [Risperdal] 2 mg PO BEDTIME 10/24/19 





Patient Discharge Instructions: Patient be transferred to inpatient psych.  For 

further evaluation and treatment.  Patient medically stable for discharge.


Diet: Regular


Activity: Ad dell


Time spent managing pt's care (in minutes): 55

## 2019-10-25 NOTE — P.PN
Subjective


Date of Service: 10/25/19


Primary Care Provider: none


Chief Complaint: Altered mental status


Subjective: Improving, Doing well





Physical Examination





- Vital Signs


Temperature: 98.2 F


Blood Pressure: 121/96


Pulse: 82


Respirations: 20


Pulse Ox (%): 97





- Physical Exam


General: Alert, In no apparent distress, Oriented x3, Cooperative


HEENT: Atraumatic


Neck: Supple


Respiratory: Clear to auscultation bilaterally, Normal air movement


Cardiovascular: Normal pulses, Regular rate/rhythm


Gastrointestinal: Normal bowel sounds, Soft and benign, Non-distended


Neurological: Normal speech, Normal strength at 5/5 x4 extr, Normal tone, 

Normal affect





- Studies


Medications List Reviewed: Yes





Assessment & Plan


Discharge Plan: Psychiatry


Plan to discharge in: 24 Hours


Physician Review Additional Text: 





Impression:


Metabolic encephalopathy related to alcohol abuse with intoxication complicated 

with amphetamine and cocaine abuse


Suicidal ideation


Depression with anxiety


Possible GERD





Plan:


Patient stable this time.  No significant agitation.  Patient on Librium.  Will 

hold Librium if with increase sedation.  Repeat lab stable.  Vital signs 

stable.  Continue with current medications.  Patient evaluated by [facility] 

yesterday.  They agree with inpatient psychiatric transfer for evaluation and 

treatment.  Continue with transfer process.  Hopefully transfer psychiatric 

facility today.  Patient in agreement with plan of care.  Patient medically 

stable for transfer. 





Time Spent Managing Pts Care (In Minutes): 55